# Patient Record
Sex: MALE | Race: WHITE | Employment: OTHER | URBAN - METROPOLITAN AREA
[De-identification: names, ages, dates, MRNs, and addresses within clinical notes are randomized per-mention and may not be internally consistent; named-entity substitution may affect disease eponyms.]

---

## 2017-03-25 ENCOUNTER — LAB REQUISITION (OUTPATIENT)
Dept: LAB | Facility: HOSPITAL | Age: 77
End: 2017-03-25
Attending: FAMILY MEDICINE
Payer: MEDICARE

## 2017-03-25 DIAGNOSIS — Z79.01 LONG TERM CURRENT USE OF ANTICOAGULANT: ICD-10-CM

## 2017-03-25 LAB
INR BLD: 2.83 (ref 0.89–1.11)
PSA SERPL DL<=0.01 NG/ML-MCNC: 30.3 SECONDS (ref 12–14.3)

## 2017-03-25 PROCEDURE — 85610 PROTHROMBIN TIME: CPT | Performed by: FAMILY MEDICINE

## 2019-01-01 ENCOUNTER — HOSPITAL ENCOUNTER (EMERGENCY)
Facility: HOSPITAL | Age: 79
Discharge: HOME | End: 2019-01-01
Attending: EMERGENCY MEDICINE
Payer: MEDICARE

## 2019-01-01 ENCOUNTER — APPOINTMENT (OUTPATIENT)
Dept: GENERAL RADIOLOGY | Facility: HOSPITAL | Age: 79
End: 2019-01-01
Attending: EMERGENCY MEDICINE
Payer: MEDICARE

## 2019-01-01 ENCOUNTER — APPOINTMENT (OUTPATIENT)
Dept: CT IMAGING | Facility: HOSPITAL | Age: 79
End: 2019-01-01
Attending: EMERGENCY MEDICINE
Payer: MEDICARE

## 2019-01-01 VITALS
HEART RATE: 93 BPM | DIASTOLIC BLOOD PRESSURE: 86 MMHG | RESPIRATION RATE: 19 BRPM | OXYGEN SATURATION: 93 % | WEIGHT: 225 LBS | BODY MASS INDEX: 35 KG/M2 | SYSTOLIC BLOOD PRESSURE: 150 MMHG

## 2019-01-01 DIAGNOSIS — R41.82 ALTERED MENTAL STATUS, UNSPECIFIED ALTERED MENTAL STATUS TYPE: Primary | ICD-10-CM

## 2019-01-01 PROCEDURE — 85730 THROMBOPLASTIN TIME PARTIAL: CPT | Performed by: EMERGENCY MEDICINE

## 2019-01-01 PROCEDURE — 80053 COMPREHEN METABOLIC PANEL: CPT | Performed by: EMERGENCY MEDICINE

## 2019-01-01 PROCEDURE — 85025 COMPLETE CBC W/AUTO DIFF WBC: CPT | Performed by: EMERGENCY MEDICINE

## 2019-01-01 PROCEDURE — 99285 EMERGENCY DEPT VISIT HI MDM: CPT

## 2019-01-01 PROCEDURE — 96361 HYDRATE IV INFUSION ADD-ON: CPT

## 2019-01-01 PROCEDURE — 93010 ELECTROCARDIOGRAM REPORT: CPT

## 2019-01-01 PROCEDURE — 96360 HYDRATION IV INFUSION INIT: CPT

## 2019-01-01 PROCEDURE — 71045 X-RAY EXAM CHEST 1 VIEW: CPT | Performed by: EMERGENCY MEDICINE

## 2019-01-01 PROCEDURE — 85610 PROTHROMBIN TIME: CPT | Performed by: EMERGENCY MEDICINE

## 2019-01-01 PROCEDURE — 82962 GLUCOSE BLOOD TEST: CPT

## 2019-01-01 PROCEDURE — 70450 CT HEAD/BRAIN W/O DYE: CPT | Performed by: EMERGENCY MEDICINE

## 2019-01-01 PROCEDURE — 84484 ASSAY OF TROPONIN QUANT: CPT | Performed by: EMERGENCY MEDICINE

## 2019-01-01 PROCEDURE — 93005 ELECTROCARDIOGRAM TRACING: CPT

## 2019-01-01 RX ORDER — SODIUM CHLORIDE 9 MG/ML
125 INJECTION, SOLUTION INTRAVENOUS CONTINUOUS
Status: DISCONTINUED | OUTPATIENT
Start: 2019-01-01 | End: 2019-01-01

## 2019-06-13 NOTE — ED INITIAL ASSESSMENT (HPI)
Staff found pt in his bed with left sided facial droop. Pt A&OX2 when at  his baseline. Pt presents with DNR paperwork and son at bedside.

## 2019-06-13 NOTE — ED PROVIDER NOTES
Patient Seen in: [facility] Emergency Department    History   Patient presents with:  Stroke (neurologic)    Stated Complaint: Left sided facial weakness    HPI    This is a 17-year-old DNR male who presents with possible stroke past medical history 127/85   Pulse 82   Resp 15   Temp    Temp src    SpO2 96 %   O2 Device        Current:/84   Pulse 88   Resp 16   Wt 102.1 kg   SpO2 97%   BMI 35.24 kg/m²         Physical Exam    GENERAL: Lying in cart with eyes closed.   He will open his eyes when a CBC W/ DIFFERENTIAL[[id number]]          Abnormal            Final result                 Please view results for these tests on the individual orders.    URINALYSIS WITH CULTURE REFLEX   RAINBOW DRAW BLUE   RAINBOW DRAW LAVENDER   RAINBOW DRAW LIGHT GREE cardiac monitor, continuous pulse oximetry and IV line was established of normal saline. Basic labs were obtained. CBC: White blood cell count 8.6. Hemoglobin 15.9. Platelet 474. CMP unremarkable. Troponin negative. INR 2.1.   X-ray showed cardiomega

## 2019-06-14 NOTE — CM/SW NOTE
Had discussion with wife of patient, her two sons and daughter in law. Pt is at [facility], under palliative care. We spoke at length about comfort management including having the discussion with his care team about hospice care.  Provided family with hospice

## 2020-01-01 ENCOUNTER — APPOINTMENT (OUTPATIENT)
Dept: GENERAL RADIOLOGY | Facility: HOSPITAL | Age: 80
DRG: 177 | End: 2020-01-01
Attending: HOSPITALIST
Payer: MEDICARE

## 2020-01-01 ENCOUNTER — HOSPITAL ENCOUNTER (INPATIENT)
Facility: HOSPITAL | Age: 80
LOS: 1 days | DRG: 177 | End: 2020-01-01
Attending: INTERNAL MEDICINE | Admitting: INTERNAL MEDICINE
Payer: OTHER MISCELLANEOUS

## 2020-01-01 ENCOUNTER — APPOINTMENT (OUTPATIENT)
Dept: GENERAL RADIOLOGY | Facility: HOSPITAL | Age: 80
DRG: 177 | End: 2020-01-01
Attending: INTERNAL MEDICINE
Payer: MEDICARE

## 2020-01-01 ENCOUNTER — APPOINTMENT (OUTPATIENT)
Dept: GENERAL RADIOLOGY | Facility: HOSPITAL | Age: 80
DRG: 177 | End: 2020-01-01
Attending: EMERGENCY MEDICINE
Payer: MEDICARE

## 2020-01-01 ENCOUNTER — HOSPITAL ENCOUNTER (INPATIENT)
Facility: HOSPITAL | Age: 80
LOS: 10 days | Discharge: HOSPICE | DRG: 177 | End: 2020-01-01
Attending: EMERGENCY MEDICINE | Admitting: HOSPITALIST
Payer: MEDICARE

## 2020-01-01 VITALS
TEMPERATURE: 99 F | OXYGEN SATURATION: 76 % | DIASTOLIC BLOOD PRESSURE: 35 MMHG | SYSTOLIC BLOOD PRESSURE: 63 MMHG | RESPIRATION RATE: 20 BRPM | HEART RATE: 92 BPM

## 2020-01-01 VITALS
HEIGHT: 67 IN | OXYGEN SATURATION: 95 % | HEART RATE: 79 BPM | TEMPERATURE: 98 F | RESPIRATION RATE: 19 BRPM | DIASTOLIC BLOOD PRESSURE: 65 MMHG | BODY MASS INDEX: 36.1 KG/M2 | WEIGHT: 230 LBS | SYSTOLIC BLOOD PRESSURE: 132 MMHG
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
## 2020-01-01 DIAGNOSIS — E86.0 DEHYDRATION: ICD-10-CM

## 2020-01-01 DIAGNOSIS — J18.9 NOSOCOMIAL PNEUMONIA: Primary | ICD-10-CM

## 2020-01-01 LAB
ADENOVIRUS PCR:: NEGATIVE
ALBUMIN SERPL-MCNC: 3 G/DL (ref 3.4–5)
ALBUMIN/GLOB SERPL: 0.7 {RATIO} (ref 1–2)
ALLENS TEST: POSITIVE
ALLENS TEST: POSITIVE
ALP LIVER SERPL-CCNC: 72 U/L (ref 45–117)
ALT SERPL-CCNC: 21 U/L (ref 16–61)
ANION GAP SERPL CALC-SCNC: 4 MMOL/L (ref 0–18)
ANION GAP SERPL CALC-SCNC: 4 MMOL/L (ref 0–18)
ANION GAP SERPL CALC-SCNC: 5 MMOL/L (ref 0–18)
ANION GAP SERPL CALC-SCNC: 9 MMOL/L (ref 0–18)
APTT PPP: 38.2 SECONDS (ref 25.4–36.1)
ARTERIAL BLD GAS O2 SATURATION: 95 % (ref 92–100)
ARTERIAL BLD GAS O2 SATURATION: 97 % (ref 92–100)
ARTERIAL BLOOD GAS BASE EXCESS: -1.4 MMOL/L (ref ?–2)
ARTERIAL BLOOD GAS BASE EXCESS: -1.9 MMOL/L (ref ?–2)
ARTERIAL BLOOD GAS HCO3: 21.1 MEQ/L (ref 22–26)
ARTERIAL BLOOD GAS HCO3: 22.3 MEQ/L (ref 22–26)
ARTERIAL BLOOD GAS PCO2: 32 MM HG (ref 35–45)
ARTERIAL BLOOD GAS PCO2: 36 MM HG (ref 35–45)
ARTERIAL BLOOD GAS PH: 7.41 (ref 7.35–7.45)
ARTERIAL BLOOD GAS PH: 7.45 (ref 7.35–7.45)
ARTERIAL BLOOD GAS PO2: 121 MM HG (ref 80–105)
ARTERIAL BLOOD GAS PO2: 83 MM HG (ref 80–105)
AST SERPL-CCNC: 17 U/L (ref 15–37)
ATRIAL RATE: 120 BPM
ATRIAL RATE: 79 BPM
B PERT DNA SPEC QL NAA+PROBE: NEGATIVE
BASOPHILS # BLD AUTO: 0.02 X10(3) UL (ref 0–0.2)
BASOPHILS # BLD AUTO: 0.03 X10(3) UL (ref 0–0.2)
BASOPHILS # BLD AUTO: 0.03 X10(3) UL (ref 0–0.2)
BASOPHILS NFR BLD AUTO: 0.2 %
BASOPHILS NFR BLD AUTO: 0.2 %
BASOPHILS NFR BLD AUTO: 0.3 %
BILIRUB SERPL-MCNC: 0.3 MG/DL (ref 0.1–2)
BILIRUB UR QL STRIP.AUTO: NEGATIVE
BUN BLD-MCNC: 17 MG/DL (ref 7–18)
BUN BLD-MCNC: 18 MG/DL (ref 7–18)
BUN BLD-MCNC: 19 MG/DL (ref 7–18)
BUN BLD-MCNC: 7 MG/DL (ref 7–18)
BUN/CREAT SERPL: 16.7 (ref 10–20)
BUN/CREAT SERPL: 20.5 (ref 10–20)
BUN/CREAT SERPL: 23.8 (ref 10–20)
BUN/CREAT SERPL: 9.7 (ref 10–20)
C DIFF TOX B STL QL: NEGATIVE
C PNEUM DNA SPEC QL NAA+PROBE: NEGATIVE
CALCIUM BLD-MCNC: 7.4 MG/DL (ref 8.5–10.1)
CALCIUM BLD-MCNC: 7.5 MG/DL (ref 8.5–10.1)
CALCIUM BLD-MCNC: 7.9 MG/DL (ref 8.5–10.1)
CALCIUM BLD-MCNC: 8.6 MG/DL (ref 8.5–10.1)
CALCULATED O2 SATURATION: 96 % (ref 92–100)
CALCULATED O2 SATURATION: 99 % (ref 92–100)
CARBOXYHEMOGLOBIN: 1 % SAT (ref 0–3)
CARBOXYHEMOGLOBIN: 1.2 % SAT (ref 0–3)
CHLORIDE SERPL-SCNC: 106 MMOL/L (ref 98–112)
CHLORIDE SERPL-SCNC: 110 MMOL/L (ref 98–112)
CHLORIDE SERPL-SCNC: 113 MMOL/L (ref 98–112)
CHLORIDE SERPL-SCNC: 114 MMOL/L (ref 98–112)
CO2 SERPL-SCNC: 22 MMOL/L (ref 21–32)
CO2 SERPL-SCNC: 24 MMOL/L (ref 21–32)
CO2 SERPL-SCNC: 24 MMOL/L (ref 21–32)
CO2 SERPL-SCNC: 25 MMOL/L (ref 21–32)
COLOR UR AUTO: YELLOW
CORONAVIRUS 229E PCR:: NEGATIVE
CORONAVIRUS HKU1 PCR:: NEGATIVE
CORONAVIRUS NL63 PCR:: NEGATIVE
CORONAVIRUS OC43 PCR:: NEGATIVE
CREAT BLD-MCNC: 0.72 MG/DL (ref 0.7–1.3)
CREAT BLD-MCNC: 0.8 MG/DL (ref 0.7–1.3)
CREAT BLD-MCNC: 0.88 MG/DL (ref 0.7–1.3)
CREAT BLD-MCNC: 1.02 MG/DL (ref 0.7–1.3)
DEPRECATED RDW RBC AUTO: 47.8 FL (ref 35.1–46.3)
DEPRECATED RDW RBC AUTO: 49.6 FL (ref 35.1–46.3)
DEPRECATED RDW RBC AUTO: 51.1 FL (ref 35.1–46.3)
EOSINOPHIL # BLD AUTO: 0 X10(3) UL (ref 0–0.7)
EOSINOPHIL # BLD AUTO: 0 X10(3) UL (ref 0–0.7)
EOSINOPHIL # BLD AUTO: 0.3 X10(3) UL (ref 0–0.7)
EOSINOPHIL NFR BLD AUTO: 0 %
EOSINOPHIL NFR BLD AUTO: 0 %
EOSINOPHIL NFR BLD AUTO: 3.4 %
ERYTHROCYTE [DISTWIDTH] IN BLOOD BY AUTOMATED COUNT: 14.2 % (ref 11–15)
ERYTHROCYTE [DISTWIDTH] IN BLOOD BY AUTOMATED COUNT: 14.5 % (ref 11–15)
ERYTHROCYTE [DISTWIDTH] IN BLOOD BY AUTOMATED COUNT: 14.6 % (ref 11–15)
FIO2: 100 %
FLUAV H1 2009 PAND RNA SPEC QL NAA+PROBE: POSITIVE
FLUBV RNA SPEC QL NAA+PROBE: NEGATIVE
GLOBULIN PLAS-MCNC: 4.5 G/DL (ref 2.8–4.4)
GLUCOSE BLD-MCNC: 101 MG/DL (ref 70–99)
GLUCOSE BLD-MCNC: 103 MG/DL (ref 70–99)
GLUCOSE BLD-MCNC: 103 MG/DL (ref 70–99)
GLUCOSE BLD-MCNC: 104 MG/DL (ref 70–99)
GLUCOSE BLD-MCNC: 104 MG/DL (ref 70–99)
GLUCOSE BLD-MCNC: 110 MG/DL (ref 70–99)
GLUCOSE BLD-MCNC: 111 MG/DL (ref 70–99)
GLUCOSE BLD-MCNC: 113 MG/DL (ref 70–99)
GLUCOSE BLD-MCNC: 114 MG/DL (ref 70–99)
GLUCOSE BLD-MCNC: 114 MG/DL (ref 70–99)
GLUCOSE BLD-MCNC: 123 MG/DL (ref 70–99)
GLUCOSE BLD-MCNC: 123 MG/DL (ref 70–99)
GLUCOSE BLD-MCNC: 132 MG/DL (ref 70–99)
GLUCOSE BLD-MCNC: 133 MG/DL (ref 70–99)
GLUCOSE BLD-MCNC: 133 MG/DL (ref 70–99)
GLUCOSE BLD-MCNC: 140 MG/DL (ref 70–99)
GLUCOSE BLD-MCNC: 67 MG/DL (ref 70–99)
GLUCOSE BLD-MCNC: 72 MG/DL (ref 70–99)
GLUCOSE BLD-MCNC: 76 MG/DL (ref 70–99)
GLUCOSE BLD-MCNC: 78 MG/DL (ref 70–99)
GLUCOSE BLD-MCNC: 82 MG/DL (ref 70–99)
GLUCOSE BLD-MCNC: 83 MG/DL (ref 70–99)
GLUCOSE BLD-MCNC: 84 MG/DL (ref 70–99)
GLUCOSE BLD-MCNC: 87 MG/DL (ref 70–99)
GLUCOSE BLD-MCNC: 89 MG/DL (ref 70–99)
GLUCOSE BLD-MCNC: 90 MG/DL (ref 70–99)
GLUCOSE BLD-MCNC: 90 MG/DL (ref 70–99)
GLUCOSE BLD-MCNC: 96 MG/DL (ref 70–99)
GLUCOSE BLD-MCNC: 98 MG/DL (ref 70–99)
GLUCOSE UR STRIP.AUTO-MCNC: NEGATIVE MG/DL
HAV IGM SER QL: 1.9 MG/DL (ref 1.6–2.6)
HAV IGM SER QL: 1.9 MG/DL (ref 1.6–2.6)
HAV IGM SER QL: 2 MG/DL (ref 1.6–2.6)
HAV IGM SER QL: 2.1 MG/DL (ref 1.6–2.6)
HAV IGM SER QL: 2.1 MG/DL (ref 1.6–2.6)
HCT VFR BLD AUTO: 36.6 % (ref 39–53)
HCT VFR BLD AUTO: 41 % (ref 39–53)
HCT VFR BLD AUTO: 48.1 % (ref 39–53)
HGB BLD-MCNC: 11.8 G/DL (ref 13–17.5)
HGB BLD-MCNC: 13.1 G/DL (ref 13–17.5)
HGB BLD-MCNC: 15.9 G/DL (ref 13–17.5)
IMM GRANULOCYTES # BLD AUTO: 0.03 X10(3) UL (ref 0–1)
IMM GRANULOCYTES # BLD AUTO: 0.04 X10(3) UL (ref 0–1)
IMM GRANULOCYTES # BLD AUTO: 0.11 X10(3) UL (ref 0–1)
IMM GRANULOCYTES NFR BLD: 0.2 %
IMM GRANULOCYTES NFR BLD: 0.5 %
IMM GRANULOCYTES NFR BLD: 1.2 %
INR BLD: 1.45 (ref 0.9–1.1)
INR BLD: 1.6 (ref 0.9–1.1)
INR BLD: 1.7 (ref 0.9–1.1)
INR BLD: 2 (ref 0.9–1.1)
INR BLD: 2.06 (ref 0.9–1.1)
INR BLD: 2.07 (ref 0.9–1.1)
INR BLD: 2.66 (ref 0.9–1.1)
INR BLD: 2.69 (ref 0.9–1.1)
IONIZED CALCIUM: 1.17 MMOL/L (ref 1.12–1.32)
KETONES UR STRIP.AUTO-MCNC: 20 MG/DL
L PNEUMO AG UR QL: NEGATIVE
L/M: 15 L/MIN
L/M: 6 L/MIN
LACTATE SERPL-SCNC: 2.6 MMOL/L (ref 0.4–2)
LACTATE SERPL-SCNC: 2.7 MMOL/L (ref 0.4–2)
LACTATE SERPL-SCNC: 4.5 MMOL/L (ref 0.4–2)
LACTIC ACID ARTERIAL: <1.6 MMOL/L (ref 0.5–2)
LEUKOCYTE ESTERASE UR QL STRIP.AUTO: NEGATIVE
LYMPHOCYTES # BLD AUTO: 0.43 X10(3) UL (ref 1–4)
LYMPHOCYTES # BLD AUTO: 1.1 X10(3) UL (ref 1–4)
LYMPHOCYTES # BLD AUTO: 1.35 X10(3) UL (ref 1–4)
LYMPHOCYTES NFR BLD AUTO: 12.3 %
LYMPHOCYTES NFR BLD AUTO: 15.3 %
LYMPHOCYTES NFR BLD AUTO: 3.4 %
M PROTEIN MFR SERPL ELPH: 7.5 G/DL (ref 6.4–8.2)
MCH RBC QN AUTO: 29.6 PG (ref 26–34)
MCH RBC QN AUTO: 30.1 PG (ref 26–34)
MCH RBC QN AUTO: 30.7 PG (ref 26–34)
MCHC RBC AUTO-ENTMCNC: 32 G/DL (ref 31–37)
MCHC RBC AUTO-ENTMCNC: 32.2 G/DL (ref 31–37)
MCHC RBC AUTO-ENTMCNC: 33.1 G/DL (ref 31–37)
MCV RBC AUTO: 91.7 FL (ref 80–100)
MCV RBC AUTO: 92.9 FL (ref 80–100)
MCV RBC AUTO: 94.3 FL (ref 80–100)
METAPNEUMOVIRUS PCR:: NEGATIVE
METHEMOGLOBIN: 0.6 % SAT (ref 0.4–1.5)
METHEMOGLOBIN: 0.7 % SAT (ref 0.4–1.5)
MONOCYTES # BLD AUTO: 0.35 X10(3) UL (ref 0.1–1)
MONOCYTES # BLD AUTO: 0.57 X10(3) UL (ref 0.1–1)
MONOCYTES # BLD AUTO: 0.66 X10(3) UL (ref 0.1–1)
MONOCYTES NFR BLD AUTO: 4 %
MONOCYTES NFR BLD AUTO: 4.5 %
MONOCYTES NFR BLD AUTO: 7.4 %
MYCOPLASMA PNEUMONIA PCR:: NEGATIVE
NEUTROPHILS # BLD AUTO: 11.57 X10 (3) UL (ref 1.5–7.7)
NEUTROPHILS # BLD AUTO: 11.57 X10(3) UL (ref 1.5–7.7)
NEUTROPHILS # BLD AUTO: 6.74 X10 (3) UL (ref 1.5–7.7)
NEUTROPHILS # BLD AUTO: 6.74 X10(3) UL (ref 1.5–7.7)
NEUTROPHILS # BLD AUTO: 7.04 X10 (3) UL (ref 1.5–7.7)
NEUTROPHILS # BLD AUTO: 7.04 X10(3) UL (ref 1.5–7.7)
NEUTROPHILS NFR BLD AUTO: 75.5 %
NEUTROPHILS NFR BLD AUTO: 79.9 %
NEUTROPHILS NFR BLD AUTO: 91.7 %
NITRITE UR QL STRIP.AUTO: POSITIVE
NT-PROBNP SERPL-MCNC: 749 PG/ML (ref ?–450)
OSMOLALITY SERPL CALC.SUM OF ELEC: 285 MOSM/KG (ref 275–295)
OSMOLALITY SERPL CALC.SUM OF ELEC: 288 MOSM/KG (ref 275–295)
OSMOLALITY SERPL CALC.SUM OF ELEC: 296 MOSM/KG (ref 275–295)
OSMOLALITY SERPL CALC.SUM OF ELEC: 297 MOSM/KG (ref 275–295)
P AXIS: 26 DEGREES
P AXIS: 35 DEGREES
P-R INTERVAL: 134 MS
P-R INTERVAL: 146 MS
P/F RATIO: 76 MMHG
PARAINFLUENZA 1 PCR:: NEGATIVE
PARAINFLUENZA 2 PCR:: NEGATIVE
PARAINFLUENZA 3 PCR:: NEGATIVE
PARAINFLUENZA 4 PCR:: NEGATIVE
PATIENT TEMPERATURE: 101.1 F
PATIENT TEMPERATURE: 98.2 F
PH UR STRIP.AUTO: 5 [PH] (ref 4.5–8)
PHOSPHATE SERPL-MCNC: 1.6 MG/DL (ref 2.5–4.9)
PHOSPHATE SERPL-MCNC: 1.8 MG/DL (ref 2.5–4.9)
PHOSPHATE SERPL-MCNC: 1.9 MG/DL (ref 2.5–4.9)
PHOSPHATE SERPL-MCNC: 2.1 MG/DL (ref 2.5–4.9)
PLATELET # BLD AUTO: 190 10(3)UL (ref 150–450)
PLATELET # BLD AUTO: 233 10(3)UL (ref 150–450)
PLATELET # BLD AUTO: 289 10(3)UL (ref 150–450)
POTASSIUM BLOOD GAS: 3.5 MMOL/L (ref 3.6–5.1)
POTASSIUM SERPL-SCNC: 2.8 MMOL/L (ref 3.5–5.1)
POTASSIUM SERPL-SCNC: 2.8 MMOL/L (ref 3.5–5.1)
POTASSIUM SERPL-SCNC: 3 MMOL/L (ref 3.5–5.1)
POTASSIUM SERPL-SCNC: 3.1 MMOL/L (ref 3.5–5.1)
POTASSIUM SERPL-SCNC: 3.3 MMOL/L (ref 3.5–5.1)
POTASSIUM SERPL-SCNC: 3.3 MMOL/L (ref 3.5–5.1)
POTASSIUM SERPL-SCNC: 3.4 MMOL/L (ref 3.5–5.1)
POTASSIUM SERPL-SCNC: 3.4 MMOL/L (ref 3.5–5.1)
POTASSIUM SERPL-SCNC: 3.5 MMOL/L (ref 3.5–5.1)
POTASSIUM SERPL-SCNC: 3.5 MMOL/L (ref 3.5–5.1)
POTASSIUM SERPL-SCNC: 3.6 MMOL/L (ref 3.5–5.1)
POTASSIUM SERPL-SCNC: 3.6 MMOL/L (ref 3.5–5.1)
POTASSIUM SERPL-SCNC: 3.8 MMOL/L (ref 3.5–5.1)
POTASSIUM SERPL-SCNC: 4.2 MMOL/L (ref 3.5–5.1)
PROT UR STRIP.AUTO-MCNC: NEGATIVE MG/DL
PSA SERPL DL<=0.01 NG/ML-MCNC: 18.4 SECONDS (ref 12.5–14.7)
PSA SERPL DL<=0.01 NG/ML-MCNC: 19.9 SECONDS (ref 12.5–14.7)
PSA SERPL DL<=0.01 NG/ML-MCNC: 20.9 SECONDS (ref 12.5–14.7)
PSA SERPL DL<=0.01 NG/ML-MCNC: 23.9 SECONDS (ref 12.5–14.7)
PSA SERPL DL<=0.01 NG/ML-MCNC: 24.5 SECONDS (ref 12.5–14.7)
PSA SERPL DL<=0.01 NG/ML-MCNC: 24.6 SECONDS (ref 12.5–14.7)
PSA SERPL DL<=0.01 NG/ML-MCNC: 30.1 SECONDS (ref 12.5–14.7)
PSA SERPL DL<=0.01 NG/ML-MCNC: 30.4 SECONDS (ref 12.5–14.7)
Q-T INTERVAL: 318 MS
Q-T INTERVAL: 386 MS
QRS DURATION: 88 MS
QRS DURATION: 88 MS
QTC CALCULATION (BEZET): 442 MS
QTC CALCULATION (BEZET): 449 MS
R AXIS: -4 DEGREES
R AXIS: -6 DEGREES
RBC # BLD AUTO: 3.99 X10(6)UL (ref 3.8–5.8)
RBC # BLD AUTO: 4.35 X10(6)UL (ref 3.8–5.8)
RBC # BLD AUTO: 5.18 X10(6)UL (ref 3.8–5.8)
RHINOVIRUS/ENTERO PCR:: NEGATIVE
RSV RNA SPEC QL NAA+PROBE: NEGATIVE
SODIUM BLOOD GAS: 145 MMOL/L (ref 136–144)
SODIUM SERPL-SCNC: 137 MMOL/L (ref 136–145)
SODIUM SERPL-SCNC: 138 MMOL/L (ref 136–145)
SODIUM SERPL-SCNC: 142 MMOL/L (ref 136–145)
SODIUM SERPL-SCNC: 143 MMOL/L (ref 136–145)
SP GR UR STRIP.AUTO: 1.02 (ref 1–1.03)
STREP PNEUMO ANTIGEN, URINE: NEGATIVE
T AXIS: 23 DEGREES
T AXIS: 5 DEGREES
TOTAL HEMOGLOBIN: 12.4 G/DL (ref 12.6–17.4)
TOTAL HEMOGLOBIN: 14.8 G/DL (ref 12.6–17.4)
UROBILINOGEN UR STRIP.AUTO-MCNC: <2 MG/DL
VENTRICULAR RATE: 120 BPM
VENTRICULAR RATE: 79 BPM
WBC # BLD AUTO: 12.6 X10(3) UL (ref 4–11)
WBC # BLD AUTO: 8.8 X10(3) UL (ref 4–11)
WBC # BLD AUTO: 8.9 X10(3) UL (ref 4–11)

## 2020-01-01 PROCEDURE — 99223 1ST HOSP IP/OBS HIGH 75: CPT | Performed by: HOSPITALIST

## 2020-01-01 PROCEDURE — 99223 1ST HOSP IP/OBS HIGH 75: CPT | Performed by: NURSE PRACTITIONER

## 2020-01-01 PROCEDURE — 71045 X-RAY EXAM CHEST 1 VIEW: CPT | Performed by: INTERNAL MEDICINE

## 2020-01-01 PROCEDURE — 99233 SBSQ HOSP IP/OBS HIGH 50: CPT | Performed by: HOSPITALIST

## 2020-01-01 PROCEDURE — 99222 1ST HOSP IP/OBS MODERATE 55: CPT | Performed by: INTERNAL MEDICINE

## 2020-01-01 PROCEDURE — 99232 SBSQ HOSP IP/OBS MODERATE 35: CPT | Performed by: INTERNAL MEDICINE

## 2020-01-01 PROCEDURE — 71045 X-RAY EXAM CHEST 1 VIEW: CPT | Performed by: HOSPITALIST

## 2020-01-01 PROCEDURE — 99233 SBSQ HOSP IP/OBS HIGH 50: CPT | Performed by: NURSE PRACTITIONER

## 2020-01-01 PROCEDURE — 71045 X-RAY EXAM CHEST 1 VIEW: CPT | Performed by: EMERGENCY MEDICINE

## 2020-01-01 RX ORDER — OMEPRAZOLE 10 MG/1
10 CAPSULE, DELAYED RELEASE ORAL DAILY
Status: ON HOLD | COMMUNITY
Start: 2020-01-01 | End: 2020-01-01

## 2020-01-01 RX ORDER — SCOLOPAMINE TRANSDERMAL SYSTEM 1 MG/1
1 PATCH, EXTENDED RELEASE TRANSDERMAL
Status: DISCONTINUED | OUTPATIENT
Start: 2020-01-01 | End: 2020-01-01

## 2020-01-01 RX ORDER — IPRATROPIUM BROMIDE AND ALBUTEROL SULFATE 2.5; .5 MG/3ML; MG/3ML
3 SOLUTION RESPIRATORY (INHALATION)
Status: DISCONTINUED | OUTPATIENT
Start: 2020-01-01 | End: 2020-01-01

## 2020-01-01 RX ORDER — ACETAMINOPHEN 650 MG/1
650 SUPPOSITORY RECTAL EVERY 6 HOURS PRN
Status: DISCONTINUED | OUTPATIENT
Start: 2020-01-01 | End: 2020-01-01

## 2020-01-01 RX ORDER — HYDRALAZINE HYDROCHLORIDE 20 MG/ML
10 INJECTION INTRAMUSCULAR; INTRAVENOUS EVERY 4 HOURS PRN
Status: DISCONTINUED | OUTPATIENT
Start: 2020-01-01 | End: 2020-01-01

## 2020-01-01 RX ORDER — ACETAMINOPHEN 650 MG/1
650 SUPPOSITORY RECTAL EVERY 4 HOURS PRN
Status: DISCONTINUED | OUTPATIENT
Start: 2020-01-01 | End: 2020-01-01

## 2020-01-01 RX ORDER — DONEPEZIL HYDROCHLORIDE 10 MG/1
10 TABLET, FILM COATED ORAL NIGHTLY
Status: DISCONTINUED | OUTPATIENT
Start: 2020-01-01 | End: 2020-01-01

## 2020-01-01 RX ORDER — TRAMADOL HYDROCHLORIDE 50 MG/1
50 TABLET ORAL DAILY PRN
Status: ON HOLD | COMMUNITY
End: 2020-01-01

## 2020-01-01 RX ORDER — ROPINIROLE 1 MG/1
1 TABLET, FILM COATED ORAL 3 TIMES DAILY
Status: DISCONTINUED | OUTPATIENT
Start: 2020-01-01 | End: 2020-01-01

## 2020-01-01 RX ORDER — IPRATROPIUM BROMIDE AND ALBUTEROL SULFATE 2.5; .5 MG/3ML; MG/3ML
3 SOLUTION RESPIRATORY (INHALATION) EVERY 6 HOURS PRN
Status: DISCONTINUED | OUTPATIENT
Start: 2020-01-01 | End: 2020-01-01

## 2020-01-01 RX ORDER — GLYCOPYRROLATE 0.2 MG/ML
0.4 INJECTION, SOLUTION INTRAMUSCULAR; INTRAVENOUS
Status: DISCONTINUED | OUTPATIENT
Start: 2020-01-01 | End: 2020-01-01

## 2020-01-01 RX ORDER — TRAMADOL HYDROCHLORIDE 50 MG/1
50 TABLET ORAL EVERY 8 HOURS PRN
Status: ON HOLD | COMMUNITY
End: 2020-01-01

## 2020-01-01 RX ORDER — ONDANSETRON 4 MG/1
4 TABLET, ORALLY DISINTEGRATING ORAL EVERY 6 HOURS PRN
Status: DISCONTINUED | OUTPATIENT
Start: 2020-01-01 | End: 2020-01-01

## 2020-01-01 RX ORDER — LORAZEPAM 2 MG/ML
2 INJECTION INTRAMUSCULAR EVERY 4 HOURS PRN
Status: DISCONTINUED | OUTPATIENT
Start: 2020-01-01 | End: 2020-01-01

## 2020-01-01 RX ORDER — SODIUM CHLORIDE 9 MG/ML
INJECTION, SOLUTION INTRAVENOUS CONTINUOUS
Status: DISCONTINUED | OUTPATIENT
Start: 2020-01-01 | End: 2020-01-01

## 2020-01-01 RX ORDER — HALOPERIDOL 5 MG/ML
1 INJECTION INTRAMUSCULAR
Status: DISCONTINUED | OUTPATIENT
Start: 2020-01-01 | End: 2020-01-01

## 2020-01-01 RX ORDER — IPRATROPIUM BROMIDE AND ALBUTEROL SULFATE 2.5; .5 MG/3ML; MG/3ML
3 SOLUTION RESPIRATORY (INHALATION) EVERY 4 HOURS PRN
Status: DISCONTINUED | OUTPATIENT
Start: 2020-01-01 | End: 2020-01-01

## 2020-01-01 RX ORDER — ACETAMINOPHEN 650 MG/1
SUPPOSITORY RECTAL
Status: DISPENSED
Start: 2020-01-01 | End: 2020-01-01

## 2020-01-01 RX ORDER — ENOXAPARIN SODIUM 100 MG/ML
40 INJECTION SUBCUTANEOUS DAILY
Status: DISCONTINUED | OUTPATIENT
Start: 2020-01-01 | End: 2020-01-01

## 2020-01-01 RX ORDER — LORAZEPAM 2 MG/ML
0.5 INJECTION INTRAMUSCULAR EVERY 4 HOURS PRN
Status: DISCONTINUED | OUTPATIENT
Start: 2020-01-01 | End: 2020-01-01

## 2020-01-01 RX ORDER — POTASSIUM CHLORIDE 14.9 MG/ML
20 INJECTION INTRAVENOUS ONCE
Status: COMPLETED | OUTPATIENT
Start: 2020-01-01 | End: 2020-01-01

## 2020-01-01 RX ORDER — MORPHINE SULFATE 4 MG/ML
1 INJECTION, SOLUTION INTRAMUSCULAR; INTRAVENOUS
Status: DISCONTINUED | OUTPATIENT
Start: 2020-01-01 | End: 2020-01-01

## 2020-01-01 RX ORDER — DEXTROSE AND SODIUM CHLORIDE 5; .9 G/100ML; G/100ML
INJECTION, SOLUTION INTRAVENOUS CONTINUOUS
Status: DISCONTINUED | OUTPATIENT
Start: 2020-01-01 | End: 2020-01-01

## 2020-01-01 RX ORDER — LORAZEPAM 2 MG/ML
1 INJECTION INTRAMUSCULAR EVERY 4 HOURS PRN
Status: DISCONTINUED | OUTPATIENT
Start: 2020-01-01 | End: 2020-01-01

## 2020-01-01 RX ORDER — ATROPINE SULFATE 10 MG/ML
2 SOLUTION/ DROPS OPHTHALMIC EVERY 2 HOUR PRN
Status: DISCONTINUED | OUTPATIENT
Start: 2020-01-01 | End: 2020-01-01

## 2020-01-01 RX ORDER — SODIUM CHLORIDE 30 MG/ML INHALATION SOLUTION 30 MG/ML
4 SOLUTION INHALANT
Status: DISCONTINUED | OUTPATIENT
Start: 2020-01-01 | End: 2020-01-01

## 2020-01-01 RX ORDER — DONEPEZIL HYDROCHLORIDE 10 MG/1
10 TABLET, FILM COATED ORAL
Status: DISCONTINUED | OUTPATIENT
Start: 2020-01-01 | End: 2020-01-01

## 2020-01-01 RX ORDER — ONDANSETRON 2 MG/ML
4 INJECTION INTRAMUSCULAR; INTRAVENOUS EVERY 6 HOURS PRN
Status: DISCONTINUED | OUTPATIENT
Start: 2020-01-01 | End: 2020-01-01

## 2020-01-01 RX ORDER — BISACODYL 10 MG
10 SUPPOSITORY, RECTAL RECTAL
Status: DISCONTINUED | OUTPATIENT
Start: 2020-01-01 | End: 2020-01-01

## 2020-01-01 RX ORDER — MORPHINE SULFATE 4 MG/ML
2 INJECTION, SOLUTION INTRAMUSCULAR; INTRAVENOUS ONCE
Status: COMPLETED | OUTPATIENT
Start: 2020-01-01 | End: 2020-01-01

## 2020-01-01 RX ORDER — HALOPERIDOL 5 MG/ML
2 INJECTION INTRAMUSCULAR
Status: DISCONTINUED | OUTPATIENT
Start: 2020-01-01 | End: 2020-01-01

## 2020-01-01 RX ORDER — ACETAMINOPHEN 650 MG/1
1300 SUPPOSITORY RECTAL ONCE
Status: COMPLETED | OUTPATIENT
Start: 2020-01-01 | End: 2020-01-01

## 2020-01-01 RX ORDER — WARFARIN SODIUM 2.5 MG/1
2.5 TABLET ORAL NIGHTLY
Status: DISCONTINUED | OUTPATIENT
Start: 2020-01-01 | End: 2020-01-01

## 2020-01-01 RX ORDER — HYDRALAZINE HYDROCHLORIDE 20 MG/ML
INJECTION INTRAMUSCULAR; INTRAVENOUS
Status: DISPENSED
Start: 2020-01-01 | End: 2020-01-01

## 2020-01-01 RX ORDER — OSELTAMIVIR PHOSPHATE 30 MG/1
30 CAPSULE ORAL 2 TIMES DAILY
Status: DISCONTINUED | OUTPATIENT
Start: 2020-01-01 | End: 2020-01-01

## 2020-01-01 RX ORDER — ACETAMINOPHEN 160 MG/5ML
650 SOLUTION ORAL EVERY 4 HOURS PRN
Status: DISCONTINUED | OUTPATIENT
Start: 2020-01-01 | End: 2020-01-01

## 2020-01-01 RX ORDER — MEMANTINE HYDROCHLORIDE 10 MG/1
10 TABLET ORAL 2 TIMES DAILY
Status: DISCONTINUED | OUTPATIENT
Start: 2020-01-01 | End: 2020-01-01

## 2020-01-01 RX ORDER — FINASTERIDE 5 MG/1
5 TABLET, FILM COATED ORAL DAILY
Status: ON HOLD | COMMUNITY
Start: 2020-01-01 | End: 2020-01-01

## 2020-03-06 PROBLEM — N40.0 BPH (BENIGN PROSTATIC HYPERPLASIA): Status: ACTIVE | Noted: 2017-02-12

## 2020-03-06 PROBLEM — J18.9 NOSOCOMIAL PNEUMONIA: Status: ACTIVE | Noted: 2020-01-01

## 2020-03-06 PROBLEM — I82.409 DVT (DEEP VENOUS THROMBOSIS) (HCC): Status: ACTIVE | Noted: 2017-02-12

## 2020-03-06 PROBLEM — F01.50 VASCULAR DEMENTIA (HCC): Status: ACTIVE | Noted: 2017-02-12

## 2020-03-06 PROBLEM — R41.82 ALTERED MENTAL STATUS: Status: ACTIVE | Noted: 2017-10-12
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
## 2020-03-06 PROBLEM — I10 HYPERTENSION: Status: ACTIVE | Noted: 2017-02-12
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
## 2020-03-06 PROBLEM — E86.0 DEHYDRATION: Status: ACTIVE | Noted: 2020-01-01

## 2020-03-06 PROBLEM — G25.81 RLS (RESTLESS LEGS SYNDROME): Status: ACTIVE | Noted: 2017-02-12

## 2020-03-06 NOTE — PROGRESS NOTES
[facility] Pharmacy Note:  Renal Adjustment for oseltamivir (TAMIFLU)    Alicia Whitaker is a 78year old male who has been prescribed oseltamivir (TAMIFLU) 75 mg every 12 hrs. CrCl is estimated creatinine clearance is 54.9 mL/min (based on SCr of 1.02 mg/dL).

## 2020-03-06 NOTE — H&P
[facility]  History and Physical     Fuentes Janak Patient Status:  Emergency    1940 MRN [id number]   Location [address] [facility] Attending Shaquille Masterson MD   Hosp Day # 0 PCP [facility]     Chief Complaint: Aden Prophet tablet, Rfl: 0  DONEPEZIL HCL 10 MG Oral Tab, TAKE 1 TABLET BY MOUTH ONCE DAILY. , Disp: 90 tablet, Rfl: 3  Memantine HCl (NAMENDA) 10 MG Oral Tab, Take 1 tablet (10 mg total) by mouth 2 (two) times daily. , Disp: 180 tablet, Rfl: 3  Warfarin Sodium (COUMADI Appropriate mood and affect.       Diagnostic Data:      Labs:  Recent Labs   Lab 03/05/20 2000 03/06/20  0347   WBC 8.3 12.6*   HGB 14.5 15.9   MCV 93.1 92.9   .0 233.0   INR  --  1.70*       Recent Labs   Lab 03/05/20 2000 03/06/20  0347   GLU 10

## 2020-03-06 NOTE — SLP NOTE
Order received for bedside swallow evaluation; chart reviewed and D/W RN. Pt currently not appropriate due to medical/respiratory status. Will defer at this time.    Lesley Cornejo, MS CCC-SLP/L, pager [phone]  Speech-LanguagePathologist

## 2020-03-06 NOTE — SEPSIS REASSESSMENT
[facility]    Sepsis Reassessment Note    /88   Pulse 116   Temp (!) 101.9 °F (38.8 °C) (Rectal)   Resp 21   Wt 104.3 kg   SpO2 95%   BMI 36.02 kg/m²      4:29 AM    Cardiac:  Regularity: Regular  Rate: Tachycardic  Heart Sounds: No adventitio

## 2020-03-06 NOTE — ED PROVIDER NOTES
Patient Seen in: [facility] Emergency Department      History   Patient presents with:  Dyspnea JESSA SOB    Stated Complaint: jessa    HPI    Patient is a 80-year-old male nursing home resident with a history of hypertension, prior stroke, dementia pre 101.9 °F (38.8 °C) (Rectal)   Resp 21   Wt 104.3 kg   SpO2 95%   BMI 36.02 kg/m²         Physical Exam  Vitals signs and nursing note reviewed. Constitutional:       Appearance: He is well-developed. Comments: Somnolent.   Opens eyes intermittently b All other components within normal limits   LACTIC ACID, PLASMA - Abnormal; Notable for the following components:    Lactic Acid 4.5 (*)     All other components within normal limits   PRO BETA NATRIURETIC PEPTIDE - Abnormal; Notable for the following presenting for evaluation of fever and increased lethargy. Patient has a history of dementia but at baseline is alert and interactive although nonambulatory.   Per nursing home, since last night he has been getting more lethargic and felt warm this morning ICD-10-CM Noted POA    Nosocomial pneumonia J18.9, Y95 3/6/2020 Unknown

## 2020-03-06 NOTE — ED NOTES
Pt was checked for incontinence, repositioned. Called Charge RN to inform them pt is ready for transport.

## 2020-03-07 NOTE — PLAN OF CARE
Patient is lethargic. Opens eyes when name called. Able to squeeze hands on command. Febrile-ice packs applied. Paged order for tylenol suppository, reassess before insertion and pt febrile. Suppository held. VSS.  On tele-NSR, ST (HR sustaining in 13

## 2020-03-07 NOTE — RESPIRATORY THERAPY NOTE
SKIP - Equipment Use Daily Summary:  · Set Mode   · Usage in hours:   · 90% Pressure (EPAP) level:   · 90% Insp Pressure (IPAP):   · AHI:   · Supplemental Oxygen:  · Comments: NO DATA

## 2020-03-07 NOTE — SLP NOTE
Patient continues to require frequent deep suctioning and not appropriate for bedside swallow evaluation at this time. Pt with reported increased intermittent awake states and responsiveness. RN request to defer for today and re-check tomorrow.  Pt remains

## 2020-03-07 NOTE — PROGRESS NOTES
[facility]  Progress Note     Lonn Hemp Patient Status:  Inpatient    1940 MRN [id number]   [facility] 3NE-A Attending Lizbet Lyn MD   Hosp Day # 1 PCP Diana Jones     Chief Complaint: PNA  S:  Confused.   Need BID   • rOPINIRole HCl  1 mg Oral TID   • enoxaparin  40 mg Subcutaneous Daily   • Oseltamivir Phosphate  30 mg Oral BID   • scopolamine  1 patch Transdermal Q72H     ASSESSMENT / PLAN:   1. Pneumonia- Suspect aspiration   1. empiric Zosyn   2.  Follow Cx r

## 2020-03-07 NOTE — PROGRESS NOTES
03/07/20 0407   BiPAP   $ RT Standby Charge (per 15 min) 1   $ SKIP Follow up charge Yes   Device Other  (v30)   BiPAP / CPAP CE# v3   Mode Spontaneous   Interface Full face mask   Mask Size Large   Control Settings   Set IPAP 12   Set EPAP 7   O2 Flow R

## 2020-03-07 NOTE — PLAN OF CARE
Patient lethargic throughout the day, baseline dementia, elevated BP, ST on tele, Non-rebreather  mask to ensure adequate oxygenation   NPO, all oral medications held s/t lethargy  Increased secretions, suctioned prn  IV ABX  IVF  Droplet precautions in pl

## 2020-03-07 NOTE — PROGRESS NOTES
03/06/20 1806   Clinical Encounter Type   Visited With Family   Routine Visit Introduction   Continue Visiting No   Patient's Supportive Strategies/Resources  provided emotional support to family.    Patient Spiritual Encounters   Spiritual Asses

## 2020-03-07 NOTE — PLAN OF CARE
Patient is lethargic. Opens eyes when name called. Able to squeeze hands on command. VSS. On tele-NSR Fever noted this afternoon with temp peak at 1600 at 102.1- rectal tylenol given, icepacks applied.  On reassessment down to 100.6   On venturi mask to pressure ulcer development  - Assess and document skin integrity  - Monitor for areas of redness and/or skin breakdown  - Initiate interventions, skin care algorithm/standards of care as needed  Outcome: Progressing

## 2020-03-08 NOTE — PROGRESS NOTES
[facility]  Progress Note     Gabriel Adams Patient Status:  Inpatient    1940 MRN [id number]   [facility] 3NE-A Attending Chacho Strickland MD   Hosp Day # 2 PCP Kristin Verma     Chief Complaint: PNA  S:  No overnight ev Imaging data reviewed in Epic.   Medications:   • potassium chloride 40mEq IVPB (peripheral line)  40 mEq Intravenous Once   • piperacillin-tazobactam  4.5 g Intravenous Q8H   • Donepezil HCl  10 mg Oral Nightly   • Memantine HCl  10 mg Oral BID   • rOPINIR

## 2020-03-08 NOTE — PLAN OF CARE
Patient is more alert this am, speech is incomprehensible but pt is able to squeeze hands and follow some commands    VSS. On tele-NSR    Monitoring for fevers  On room air since 9am and tolerating well   Oral suction and oral care provided.  Some bleeding and/or skin breakdown  - Initiate interventions, skin care algorithm/standards of care as needed  Outcome: Progressing

## 2020-03-08 NOTE — PLAN OF CARE
Resumed care at 1930  Pt sleeping  Labored breathing, secretions. Suctioned with Yankauer and large amount of sputum removed.  Venti mask on and O2 Sats WNL  When pt did awaken he followed commands  Strict NPO  Stg II on sacrum, mepilex in place  [email or web address] as needed  Outcome: Progressing

## 2020-03-08 NOTE — CONSULTS
[address] Patient Status:  Inpatient    1940 MRN [id number]   [facility] 3NE-A Attending Nanette Falcon MD   [facility] Day # 2 PCP [facility]     Date of Admission: 3/6/2020  3:09 AM  Admission Diagnosis: Deh Home Medications:  No outpatient medications have been marked as taking for the 3/6/20 encounter [facility] Encounter).        Current Medications:    Current Facility-Administered Medications:   •  potassium chloride 40 mEq in sodium chloride 0.9% 250 mL fever, hives, or new allergies       OBJECTIVE:  Patient Vitals for the past 24 hrs:   BP Temp Temp src Pulse Resp SpO2   03/08/20 1102 (!) 160/92 99.2 °F (37.3 °C) Axillary 98 20 98 %   03/08/20 0729 143/78 97.4 °F (36.3 °C) Axillary 78 20 96 %   03/08/20 CO2 25.0 03/08/2020    BUN 19 03/08/2020    CREATSERUM 0.80 03/08/2020    GLU 84 03/08/2020    CA 7.9 03/08/2020     Lab Results   Component Value Date    PT 14.4 01/04/2012    INR 1.60 (H) 03/08/2020    INR 2.06 (H) 03/07/2020    INR 1.70 (H) 03/06/2020

## 2020-03-09 NOTE — PLAN OF CARE
Patient alert to person and place, remains drowsy and confused. Speech garbled/incomprehensible at times. Not always able to follow simple commands. On room air while awake, lungs diminished/coarse with intermittent expiratory wheezes.  Receives scheduled n for pressure ulcer development  - Assess and document skin integrity  - Monitor for areas of redness and/or skin breakdown  - Initiate interventions, skin care algorithm/standards of care as needed  Outcome: Progressing

## 2020-03-09 NOTE — CONSULTS
INFECTIOUS DISEASE CONSULTATION    Janes Partida Patient Status:  Inpatient    1940 MRN [id number]   [facility] [place] 3NE-A Attending Chrystal Oppenheim, MD   Hosp Day # 3 [facility] acetaminophen (TYLENOL) 650 MG rectal suppository 650 mg, 650 mg, Rectal, Q6H PRN  •  0.9% NaCl infusion, , Intravenous, Continuous  •  Piperacillin Sod-Tazobactam So (ZOSYN) 4.5 g in dextrose 5 % 100 mL MBP/ADD-vantage, 4.5 g, Intravenous, Q8H  •  Donepez tablet by mouth daily. , Disp: , Rfl:   acetaminophen (TYLENOL EXTRA STRENGTH) 500 MG Oral Tab, Take 500 mg by mouth every 6 (six) hours as needed for Pain., Disp: , Rfl:         Review of Systems:    A comprehensive 10 point review of systems was completed Result Value Ref Range    Urine Culture 50,000-99,000 CFU/ML Escherichia coli (A) N/A       Susceptibility    Escherichia coli -  (no method available)     Ampicillin 8 Sensitive      Cefazolin <=4 Sensitive      Ciprofloxacin >=4 Resistant      Gentamic

## 2020-03-09 NOTE — PLAN OF CARE
Patient drowsy, lethargic. On 5L, . Scheduled nebs. NSR on tele. Dobhoff placed. Medications via dobhoff. Tube feeds started. Vital 1.2 running at 25mL/hr with 50mL water flush q6. Droplet precautions maintained.   Potassium replaced per protocol

## 2020-03-09 NOTE — PROGRESS NOTES
[facility]  Progress Note     Nam Waldrop Patient Status:  Inpatient    1940 MRN [id number]   [facility] 3NE-A Attending Mily Casper MD   Hosp Day # 3 PCP Bruno Christine     Chief Complaint: PNA  S:  No overnight ev 19.9* 18.4*   INR 2.06* 1.60* 1.45*     No results for input(s): TROP, CK in the last 168 hours. Imaging: Imaging data reviewed in Epic.   Medications:   • potassium chloride 40mEq IVPB (peripheral line)  40 mEq Intravenous Once    Followed by   • potas

## 2020-03-09 NOTE — PROGRESS NOTES
[address] Patient Status:  Inpatient    1940 MRN [id number]   [facility] 3NE-A Attending Judi Pearson MD   Hosp Day # 3 PCP Garcia Hammer     SUBJECTIVE: Pt more lethargic today, not arousable to voice.                         Chest wall: No tenderness or deformity.                         CUEJN: BTMJTAP rate and rhythm, normal S1S2                          Abdomen: soft, non-tender, non-distended, positive BS.                           Extremity: No cl

## 2020-03-09 NOTE — DIETARY NOTE
[facility]    NUTRITION ASSESSMENT    Pt does not meet malnutrition criteria. NUTRITION DIAGNOSIS/PROBLEM:    Inadequate oral intake related to physiological causes as evidenced by NPO status    NUTRITION INTERVENTION:       1.  Meal and Snacks - as nourished per visual exam.     2. Fluid Accumulation: upper extremity edema per visual exam.    NUTRITION PRESCRIPTION:  Calories: 4104-7437 calories/day (11-14 calories per kg)  Protein: 134 grams protein/day (2 grams protein per kg/IBW)  Fluid: ~1 ml/kca

## 2020-03-09 NOTE — SLP NOTE
Attempted to see pt at bedside this AM for swallow evaluation. RN reported pt to be too lethargic and still requiring suctioning. Pt not appropriate for bedside evaluation and PO trials at this time. Will continue to follow up as scheduling permits.  Thank

## 2020-03-09 NOTE — RESPIRATORY THERAPY NOTE
SKIP Equipment Usage Summary :            Set Mode :BIPAP W FLEX          Usage in Hours:10;30          90% Pressure (EPAP) :7            90% Insp Pressure (IPAP);12          AHI : 20.1          Supplemental Oxygen LPM :10

## 2020-03-10 NOTE — RESPIRATORY THERAPY NOTE
SKIP - Equipment Use Daily Summary:                  . Set Mode: BI-FLEX                . Usage in hours: 7:27                . 90% Pressure (EPAP) level: 7                . 90% Insp. Pressure (IPAP): 12                . AHI: 9.5                .  Supplementa

## 2020-03-10 NOTE — PLAN OF CARE
Problem: Impaired Swallowing  Goal: Minimize aspiration risk  Description  -NPO  -Frequent, gentle oral care/hygiene with suction, as tolerated   Outcome: Progressing

## 2020-03-10 NOTE — PROGRESS NOTES
03/10/20 0313   BiPAP   $ RT Standby Charge (per 15 min) 1   $ SKIP Follow up charge Yes   $ BiPAP in use daily Yes   Device VPAP   BiPAP / CPAP CE# V3   Mode Spontaneous   Interface Full face mask   Mask Size Large   Control Settings   Set IPAP 12   Set

## 2020-03-10 NOTE — SLP NOTE
ADULT SWALLOWING EVALUATION    ASSESSMENT    ASSESSMENT/OVERALL IMPRESSION:  Requested by RN/MD to see patient for BSE due to increased alertness and overall responsiveness. Pt received awake, eyes open, and mumbling with occasional true words uttered.  Att respiratory failure with hypoxia (HCC)    WILLIAM (acute kidney injury) ([facility])    Lactic acidosis    Dementia without behavioral disturbance ([facility])    Old cerebrovascular accident (CVA) without late effect      Past Medical History  Past Medical History:   Diagn 03/11/20    Thank you for your referral.   If you have any questions, please contact Rony Deutsch 87 CCC-SLP/L, pager [phone]  Speech-LanguagePathologist

## 2020-03-10 NOTE — PROGRESS NOTES
[facility]                INFECTIOUS DISEASE PROGRESS NOTE    Lazarus Burly Patient Status:  Inpatient    1940 MRN [id number]   [facility] 3NE-A Attending Kim Gentile MD   [facility] Day # 4 PCP Professor Juan Manuel Child 192 ROUTINE     Status: Abnormal    Collection Time: 03/06/20  3:48 AM   Result Value Ref Range    Urine Culture 50,000-99,000 CFU/ML Escherichia coli (A) N/A       Susceptibility    Escherichia coli -  (no method available)     Ampicillin 8 Sensitive      Cef

## 2020-03-10 NOTE — PLAN OF CARE
A/Ox2. Still drowsy but is arousable. Slurred speech is baseline. Pt is on 5L O2 during day as Oxygen needs have increased, BIPAP at night with 1 episode of desaturation into low 80s with increase of O2 to max 15L and resaturation into 90s.  12 L O2 on Bipa SKIN/TISSUE INTEGRITY - ADULT  Goal: Skin integrity remains intact  Description  INTERVENTIONS  - Assess and document risk factors for pressure ulcer development  - Assess and document skin integrity  - Monitor for areas of redness and/or skin breakdown  -

## 2020-03-10 NOTE — PROGRESS NOTES
[address] Patient Status:  Inpatient    1940 MRN [id number]   [facility] 3NE-A Attending Ryder Jones MD   [address] Day # 4 PCP Radhika Gan     SUBJECTIVE: no new events. Currently on room air.   Difficult atraumatic, no cyanosis or edema        Lab Results   Component Value Date    K 3.6 03/10/2020     Lab Results   Component Value Date    PT 14.4 01/04/2012    INR 1.45 (H) 03/09/2020    INR 1.60 (H) 03/08/2020    INR 2.06 (H) 03/07/2020         Recent Labs

## 2020-03-10 NOTE — CM/SW NOTE
Updates sent to [address] [place] [address].  2:06pm  MSW spoke to patient's wife and she states plan is for patient to return to [address] [place] [address] at D/C.

## 2020-03-10 NOTE — PLAN OF CARE
Assumed pt care @ 0730. Drowsy at times oriented to self when alert. Speech difficult to understand. VSS. Denies pain. On room air. NSR on telemetry. NPO. Accu checks q6h. Tube feeds remain on hold.  Speech attempted swallow eval. Pt not following commands

## 2020-03-10 NOTE — PROGRESS NOTES
[facility]  Progress Note     Reyes Moriahla Patient Status:  Inpatient    1940 MRN [id number]   [facility] 3NE-A Attending Juan Valdez MD   Hosp Day # 4 PCP China Mena     Chief Complaint: PNA  S:  No overnight ev TP 6.4 7.5  --   --   --   --   --      Estimated Creatinine Clearance: 70 mL/min (based on SCr of 0.8 mg/dL).   Recent Labs   Lab 03/07/20  0653 03/08/20  0653 03/09/20  0625   PTP 24.5* 19.9* 18.4*   INR 2.06* 1.60* 1.45*     No results for input(s): TR

## 2020-03-11 NOTE — CONSULTS
[facility]  Report of Inpatient Wound Care Consultation     Yen Serrano Patient Status:  Inpatient    1940 MRN [id number]   [facility] 3NE-A [id number] Attending Kwasi Vasquez, DO   Hosp Day # 5 PCP Alton Bowser should be described as unstageable if the base is obscured by eschar or slough. Until enough slough and/or eschar is removed to expose the base of the wound, neither the true depth, nor the stage, can be determined.    Body Part: sacral region     Wound Po being repositioned frequently. Moisture, nutrition and pressure distribution being managed by care team.  Based on this assessment, patient would benefit from the following:     Thank you for this consultation and for allowing me to participate in the care

## 2020-03-11 NOTE — SLP NOTE
SPEECH DAILY NOTE - INPATIENT    ASSESSMENT & PLAN   ASSESSMENT  Patient seen to assess for improvement in swallow function. He is alert with eyes open. Mostly unintelligible speech though a few words here and there are understandable.   He can be resisti in reassessment of swallow function  In Progress     FOLLOW UP  Follow Up Needed: Yes  SLP Follow-up Date: 03/12/20  Number of Visits to Meet Established Goals: 5    Session: 1 of 5    If you have any questions, please contact Luis Eduardo Mckay

## 2020-03-11 NOTE — PROGRESS NOTES
Pulmonary Progress Note        NAME: [address] S: [id number] - MRN: [id number] - Age: 78year old - : 1940        Last 24hrs: Overnight events reviewed, conts to have fluctuating O2 requirements indicative of mucus plugging    OBJECTIVE currently  -BD protocol  2.  PNA  -LLL infiltrate more prominent on most recent CXR, suspect this is related in part to IVF resuscitation since admit and not necessarily worsening/inadequately treated infection  -continue zosyn per ID  -sputum culture with

## 2020-03-11 NOTE — PLAN OF CARE
Assumed care at [address] [place] [address]. Pt is A&O x1, oriented to self. Very difficult to understand speech, slurred, baseline. On RA, . NSR on tele. NS infusing at 75 ml/hr. Strict NPO, pills given through NG tube. Receives IV abx zosyn. Speech to eval tomorrow.  Staff w

## 2020-03-11 NOTE — PROGRESS NOTES
[facility]                INFECTIOUS DISEASE PROGRESS NOTE    Julissa Rachelin Patient Status:  Inpatient    1940 MRN [id number]   [facility] 3NE-A Attending Ifrah Majano MD   [facility] Day # 5 PCP Professor Juan Manuel Child 192 = values in this interval not displayed. No results found for: [facility] Encounter on 03/06/20   1.  URINE CULTURE, ROUTINE     Status: Abnormal    Collection Time: 03/06/20  3:48 AM   Result Value Ref Range    Urine Culture 50,0

## 2020-03-11 NOTE — PROGRESS NOTES
[facility]  Progress Note     Zakiya Lambert Patient Status:  Inpatient    1940 MRN [id number]   [facility] 3NE-A Attending Leslie Burrell MD   Hosp Day # 5 PCP Savana Lala     Chief Complaint: PNA  S:  Reportedly havi ALT 20 21  --   --   --   --   --   --    BILT 0.2 0.3  --   --   --   --   --   --    TP 6.4 7.5  --   --   --   --   --   --     < > = values in this interval not displayed. Estimated Creatinine Clearance: 70 mL/min (based on SCr of 0.8 mg/dL).   R

## 2020-03-11 NOTE — RESPIRATORY THERAPY NOTE
SKIP - Equipment Use Daily Summary:                  . Set Mode:                . Usage in hours:                . 90% Pressure (EPAP) level:                . 90% Insp. Pressure (IPAP): Allison Band AHI:                .  Supplemental Oxygen:    LPM

## 2020-03-12 NOTE — CM/SW NOTE
Care Progression Note:  Active Acute Medical Issue: Nosocomial pneumonia   Other Contributing Medical Factors/Dx. : e coli uti  Length of stay: 6  Avoidable Delays:   Discharge Barriers: continue abxs for uti and pneumonia / ?aspiration--speech eval. For H1

## 2020-03-12 NOTE — RESPIRATORY THERAPY NOTE
SKIP - Equipment Use Daily Summary:                  . Set Mode: BI-FLEX                . Usage in hours: 11:19                . 90% Pressure (EPAP) level: 7                . 90% Insp. Pressure (IPAP): 12                . AHI: 14.4                .  Supplemen

## 2020-03-12 NOTE — PROGRESS NOTES
[facility]                INFECTIOUS DISEASE PROGRESS NOTE    Ushaevra Malloyl Patient Status:  Inpatient    1940 MRN [id number]   [facility] 3NE-A Attending Yazan Rogers MD   [facility] Day # 6 PCP Professor Juan Manuel Padilla [facility] Encounter on 03/06/20   1.  URINE CULTURE, ROUTINE     Status: Abnormal    Collection Time: 03/06/20  3:48 AM   Result Value Ref Range    Urine Culture 50,000-99,000 CFU/ML Escherichia coli (A) N/A       Susceptibility    Esc

## 2020-03-12 NOTE — DIETARY NOTE
[facility]    NUTRITION ASSESSMENT    Pt does not meet malnutrition criteria. NUTRITION DIAGNOSIS/PROBLEM:    Inadequate oral intake related to physiological causes as evidenced by NPO status    NUTRITION INTERVENTION:       1.  Meal and Snacks - as lb)  10/17/11 : 107 kg (236 lb)  05/31/11 : 107.5 kg (237 lb)      NUTRITION:  Diet: NPO  Oral Supplements: na    FOOD/NUTRITION RELATED HISTORY:  Appetite:   Intake: 0%  Intake Meeting Needs: No; Kassiee TF started  Food Allergies: No  Cultural/Ethnic/Rel

## 2020-03-12 NOTE — PROGRESS NOTES
[address] Patient Status:  Inpatient    1940 MRN [id number]   [facility] 3NE-A Attending Sahil Perales, DO   Hosp Day # 6  [address] / Critical Care Progress Note     S: pt back on bipap for 03/09/20  0625 03/12/20  0713   INR 1.60* 1.45* 2.00*         Recent Labs   Lab 03/06/20  0347 03/07/20  0653 03/08/20  0653  03/10/20  0633 03/11/20  0726 03/12/20  0713    142 143  --   --   --   --    K 4.2 3.8 3.4*   < > 3.6 3.6 3.3*    113

## 2020-03-12 NOTE — PROGRESS NOTES
[place] [facility]  Progress Note     Keily Bradford Patient Status:  Inpatient    1940 MRN [id number]   [facility] 3NE-A Attending Herman Mauricio MD   Hosp Day # 6 PCP Mariajose Miller     Chief Complaint: PNA  S:  Patient having --   --   --    ALT 20 21  --   --   --   --   --   --    BILT 0.2 0.3  --   --   --   --   --   --    TP 6.4 7.5  --   --   --   --   --   --     < > = values in this interval not displayed.      Estimated Creatinine Clearance: 70 mL/min (based on SCr of 0

## 2020-03-12 NOTE — PLAN OF CARE
Assumed care at 1. Pt is A&O x2, oriented to self and location. Garbled speech baseline, hx of dementia. On RA, Bipap at night. 02 sats in the 90s.    Nsr on tele, coumadin ordered tonight, per Dr. Sunshine Santos coumadin held, clarified with Dr. Muhammad Sierra

## 2020-03-12 NOTE — SLP NOTE
SPEECH DAILY NOTE - INPATIENT    ASSESSMENT & PLAN   ASSESSMENT  Patient seen at bedside for ongoing diagnostic intervention for PO intake readiness. Spouse present. Pt with stable O2 sats and RR while on 6L O2 via NC.   Pt verbally responsive with appropri Visits to Meet Established Goals: 5    Session: 2    If you have any questions, please contact Rony Chahal 87 CCC-SLP/L, pager [phone]  Speech-LanguagePathologist

## 2020-03-13 NOTE — PLAN OF CARE
I called spouse, no answer left call back #. Son Catheryn Ganser then called RN asking for me. I called him, no answer also. Will try to touch base with them and review events of this admission.  ER June records indicate more comfort care approach, however has be

## 2020-03-13 NOTE — CONSULTS
ABDIAS Tomas 77  [id number]  Hospital Day #7  Date of Consult:   3/13/2020        Reason for Consultation:      Consult requested by Leslee Lockhart  for evaluation of palliative care needs, Jacob Reese Information:  Zoroastrian Affiliation: leida    Functional Status: He has not walked for the past 3 years while living at [facility]. He had dinner with his family every night while at [facility].   Historically he recurrent hospitalizations after falls and w 03/13/2020    HCT 36.6 (L) 03/13/2020    .0 03/13/2020       Coags:  Lab Results   Component Value Date    PT 14.4 01/04/2012    INR 2.00 (H) 03/12/2020    PTT 38.2 (H) 03/06/2020     Chemistry:  Lab Results   Component Value Date    CREATSERUM 0.72 Occasional  Assist Normal or   Reduced Full or confused   50 Mainly sit/lie Extensive Disease  Can’t do any work   Partial Assist Normal or Reduced Full or confused   40 Mainly in bed Extensive Disease  Can’t do any work Mainly Assist Normal or Reduced Ful emotional then his wife Ariana Steele. Ariana Steele and Williams Newton recalled the series of events since last Thursday. They understand he has PNA, influenza and UTI. Nany Iglesias stated it was difficulty to watch his swallow evaluation as he was not able to swallow.  She shared he not sustain him long term, he could have medication/ Morphine to make the work of breathing easier and the tube could come out of his nose.  Comfort care would focus on symptom relief, not treating his symptoms and hospice would assist.. Thelma Shay understands appropriate for on going medical management and treatment for new potential symptoms or complications with avoidance of aggressive resuscitation ( DNR/DNI is appropriate)     Comfort care is appropriate when further readmissions, aggressive treatment and e [phone]  3/13/2020, 3:52 PM

## 2020-03-13 NOTE — PROGRESS NOTES
Pulmonary Progress Note        NAME: [address] S: 1206/4544-O - MRN: [id number] - Age: 78year old - : 1940        Last 24hrs: No events overnight, back on 6L this AM w/ sats in the high 90s    OBJECTIVE:   20  0043 20  6176 protocol  2.  PNA  -LLL infiltrate more prominent on most recent CXR, suspect this is related in part to IVF resuscitation since admit and not necessarily worsening/inadequately treated infection  -continue zosyn per ID  -sputum culture with normal respirat

## 2020-03-13 NOTE — DIETARY NOTE
Clinical Nutrition    Vital 1.2 started @ 20ml/hr last night. No reported residuals. No reported nausea/vomiting. Labs reviewed - concern for refeeding due to drop in K+ & Mg. Phos also noted to be low.  Continue Vital 1.2 @ 20ml/hr & recommend repleting el

## 2020-03-13 NOTE — PROGRESS NOTES
[facility]                INFECTIOUS DISEASE PROGRESS NOTE    Vero Felton Patient Status:  Inpatient    1940 MRN [id number]   [facility] 3NE-A Attending Bruna Snowden MD   [facility] Day # 7 PCP Professor Juan Manuel Padilla Gentamicin <=1 Sensitive      Meropenem <=0.25 Sensitive      Levofloxacin >=8 Resistant      Nitrofurantoin <=16 Sensitive      Piperacillin + Tazobactam <=4 Sensitive      Trimethoprim/Sulfa <=20 Sensitive    2.  BLOOD CULTURE     Status: None    Collecti

## 2020-03-13 NOTE — SLP NOTE
Attempted to see Pt for ongoing swallowing assessment at bedside; Pt sleeping and arousable to verbal stimuli however unable to maintain sustained alertness for PO intake. Spouse not present. SLP to con't to follow.   Anticipate as Pt sustained alertness im

## 2020-03-13 NOTE — PROGRESS NOTES
[place] [facility]  Progress Note     Sukhwinder Weldon Patient Status:  Inpatient    1940 MRN [id number]   [facility] 3NE-A Attending Mac Trevino MD   Hosp Day # 7 PCP Claribel Eldridge     Chief Complaint: PNA  S:  Tube feeds star input(s): TROP, CK in the last 168 hours. Imaging: Imaging data reviewed in Epic.   Medications:   • potassium chloride 40mEq IVPB (peripheral line)  40 mEq Intravenous Once    Followed by   • potassium chloride  20 mEq Intravenous Once   • guaiFENesin

## 2020-03-13 NOTE — PLAN OF CARE
Patient alert to baseline. IV fluids running per orders, IV ABX, IV potassium per orders. Patient repositioned for comfort. No signs of pain/discomfort noted. Feeding via NG tube, residual 10cc-dietary notified. Flush 50ml every 6 hours.  Pump programme tolerated, if ordered  - Obtain nutritional consult as needed  - Evaluate fluid balance  Outcome: Progressing

## 2020-03-13 NOTE — PLAN OF CARE
A&Ox2, garbled speech. Bipap worn while sleeping. Deep suction PRN. NSR on tele. VSS. Appears to be resting comfortably in bed. IV zosyn. Tube feeding running at 20mL/hr. Will not advance tonight per dietary. PIV- IVF. Multiple loose BM.  Droplet isolation Minimize aspiration risk  Description  -NPO  -Frequent, gentle oral care/hygiene with suction, as tolerated   Outcome: Progressing     Problem: GASTROINTESTINAL - ADULT  Goal: Minimal or absence of nausea and vomiting  Description  INTERVENTIONS:  - Loreta Key

## 2020-03-14 NOTE — PROGRESS NOTES
[place] [facility]  Progress Note     Alicia Whitaker Patient Status:  Inpatient    1940 MRN [id number]   [facility] 3NE-A Attending Kobe Davis MD   Hosp Day # 8 PCP Negro List     Chief Complaint: PNA  S:  Is having loose Epic.  Medications:   • potassium chloride 40mEq IVPB (peripheral line)  40 mEq Intravenous Once   • sodium chloride  4 mL Nebulization TID   • guaiFENesin  400 mg Oral Q6H   • Warfarin Sodium  2.5 mg Oral Nightly   • ipratropium-albuterol  3 mL Nebulizati

## 2020-03-14 NOTE — PROGRESS NOTES
[facility]                INFECTIOUS DISEASE PROGRESS NOTE    Elias Milian Patient Status:  Inpatient    1940 MRN [id number]   [facility] 3NE-A Attending Marty Lemus MD   [facility] Day # 8 PCP Professor Juan Manuel Child 192 Sensitive      Levofloxacin >=8 Resistant      Nitrofurantoin <=16 Sensitive      Piperacillin + Tazobactam <=4 Sensitive      Trimethoprim/Sulfa <=20 Sensitive    2.  BLOOD CULTURE     Status: None    Collection Time: 03/06/20  3:48 AM   Result Value Ref R

## 2020-03-14 NOTE — PROGRESS NOTES
Pulmonary Progress Note     Assessment / Plan:  1. Acute hypoxemic respiratory failure: 2/2 influenza with superimposed bacterial PNA, mucus plugging likely also playing a role. Resolved  -on RA  -cont pulmonary toilet with metanebs   -BD protocol  2.  PNA

## 2020-03-14 NOTE — PLAN OF CARE
Assumed care of pt at [address]. Pt drowsy. Unable to assess level of orientation. He is only able to tell me his name. Incomprehensible speech. Bipap at night. VSS. 2 loose BM. Appears to be resting comfortably in bed. Tube feedings continued at 20mL/hr.  Resid needed  Outcome: Progressing     Problem: Impaired Swallowing  Goal: Minimize aspiration risk  Description  -NPO  -Frequent, gentle oral care/hygiene with suction, as tolerated   Outcome: Progressing     Problem: GASTROINTESTINAL - ADULT  Goal: Minimal or

## 2020-03-14 NOTE — SLP NOTE
SPEECH DAILY NOTE - INPATIENT    ASSESSMENT & PLAN   ASSESSMENT  Patient was seen for clinical swallow re-evaluation this morning. RN approved this visit.  Hospital course complicated by findings of influenza H1 , superimposed bacterial PNA, left lower lobe ice chips with cues to close mouth and manipulate ice chip; no  anterior leakage; increased A-P time on all trials; reduction in bolus formation with evidence min-mild of oral residuals scattered throughout oral cavity; cannot r/o reduced bolus control.

## 2020-03-14 NOTE — RESPIRATORY THERAPY NOTE
SKIP Equipment Usage Summary :            Set Mode :BIPAP W FLEX          Usage in Hours:10;41          90% Pressure (EPAP) : 7           90% Insp Pressure (IPAP);12          AHI : 10.3          Supplemental Oxygen LPM : 6          SPONT IPAP PRESSURE setti

## 2020-03-14 NOTE — PLAN OF CARE
Patient alert to baseline. Con't to reposition. IV fluids/ABX/Potassium running per orders. DW/ Dr. Marylee Parry rectal tube d/t LBMs,no rectal tube at this point. Hospitalist notified in change in wound/sacrum. Texan cath applied.   Dietary ordered same feed Evaluate fluid balance  Outcome: Progressing

## 2020-03-15 NOTE — SLP NOTE
Attempted to re-evaluate swallow skills today, however patient unavailable and now receiving RT treatment. Will continue POC tomorrow.

## 2020-03-15 NOTE — PROGRESS NOTES
[place] [facility]  Progress Note     Marcell Moreno Patient Status:  Inpatient    1940 MRN [id number]   [facility] 3NE-A Attending Judi Pearson MD   Hosp Day # 9 PCP Garcia Hammer     Chief Complaint: PNA  S: a little more al • sodium chloride  4 mL Nebulization TID   • guaiFENesin  400 mg Oral Q6H   • Warfarin Sodium  2.5 mg Oral Nightly   • ipratropium-albuterol  3 mL Nebulization QID   • piperacillin-tazobactam  4.5 g Intravenous Q8H   • Donepezil HCl  10 mg Oral Nightly

## 2020-03-15 NOTE — PROGRESS NOTES
A/O x 1  Droplet precautions for flu maintained  Accucheck Q 6  Feeding at 20ml hr, check residuals Q 6. Tolerating at this time, residual was 90 at 2200 and 10ml at 0500.   [facility]  Diarrhea 2 x overnight  Bottom excoriated- Mepilex  Famil

## 2020-03-15 NOTE — PLAN OF CARE
Patient alert X1 but more responsive today than previous days. No c/o pain when asked, no signs of pain/discomfort. Patient repositioned. IV fluids/ABX/potassium running per orders. Plan of care/updates discusses w/son Ryan over phone.  Wife was with [place]

## 2020-03-16 PROBLEM — J18.9 PNEUMONIA: Status: ACTIVE | Noted: 2020-01-01

## 2020-03-16 NOTE — RESPIRATORY THERAPY NOTE
SPONT   IPAP PRESSURE setting : 12                                EPAP PRESSURE ) setting : 7   Pt on BiPAP per Dr. Chai Butler. Will monitor pt.

## 2020-03-16 NOTE — RESPIRATORY THERAPY NOTE
SKIP Equipment Usage Summary :            Set Mode :BIPAP W FLEX          Usage in Hours:10;22          90% Pressure (EPAP) : 7           90% Insp Pressure (IPAP);12          AHI : 23          Supplemental Oxygen LPM :          BIpap ( INSP PRESSURE ) setti

## 2020-03-16 NOTE — PROGRESS NOTES
[facility]  Follow Up     Saeed Whitney  [id number]  Hospital Day #10  Date of Consult: 03/13/20  Patient seen at: [facility]     Subjective:      Patient was seen and examined without family at the bedside.    Way 03/13/2020     03/13/2020    K 3.3 (L) 03/16/2020     03/13/2020    CO2 24.0 03/13/2020     (H) 03/13/2020    CA 7.5 (L) 03/13/2020    ALB 3.0 (L) 03/06/2020    ALKPHO 72 03/06/2020    BILT 0.3 03/06/2020    TP 7.5 03/06/2020    AST 17 0 do any work Max Assist  Total Care Minimal Drowsy/confused   10 Bedbound/coma Extensive Disease  Can’t do any work  coma  Max Assist  Total Care [address] or coma   0 Death     Palliative Care Assessment     Goals of Care:  Rudy Escobar his wife was pres deferred to [address] - wife Megha Islas  - Psychosocial: Emotional support provided to the family.      - Disposition: plan to transition to comfort care with support of Hospice    - Discussed today’s visit with Jesus Manuel Enriquze Ma

## 2020-03-16 NOTE — PLAN OF CARE
Patient is A&Ox1  Denies any pain or discomfort  No n/v/d  Afebrile, VSS  TF at 20ml/hr, residual checks q6h: 85ml at 2000  NSR on tele; on room air  L  NGT noted  Sensicare applied to sacral-perineal region; scrotum swollen and red  On clinitron bed  Accu nausea and vomiting  Description  INTERVENTIONS:  - Maintain adequate hydration with IV or PO as ordered and tolerated  - Nasogastric tube to low intermittent suction as ordered  - Evaluate effectiveness of ordered antiemetic medications  - Provide nonphar

## 2020-03-16 NOTE — DISCHARGE SUMMARY
[facility] HOSPITALIST  DISCHARGE SUMMARY     Joel Melo Patient Status:  Inpatient    1940 MRN [id number]   [facility] 3NE-A Attending John Hennessy, DO   Hosp Day # 10 PCP Delma Angelo     Date of Admission: 3/6/2020  Date of [place] care was consulted. We reviewed GOC and discussed option of hospice. Spouse and 2 children present today have decided on inpatient hospice.     Consultants: pulmonology, ID, wound care, palliative care      ELMIRA Ren    Time spent:  > 30 minute

## 2020-03-16 NOTE — HOSPICE RN NOTE
Hospice SW and RN met with wife Isabel and son Ghulam Allison and his wife Jess Ryan to review hospice philosophy and benefit. Levels of hospice and GOC reviewed. Questions answered. Consents signed for hospice.   Pt evaluated and found to have RR36 with BiPap in place w

## 2020-03-16 NOTE — CM/SW NOTE
Received referral , placed ECIN. Called [place], [place] Will call wife to set time to meet. .     Meeting at 2 pm in front lobby

## 2020-03-16 NOTE — PLAN OF CARE
Reviewed w/ palliative apn. She just spoke with spouse who is ready for Hospice consult.   Updated MD.    Chuy KU  12:41 PM

## 2020-03-16 NOTE — HOSPICE RN NOTE
Spoke with wife Мария Mckinney about VS once per shift being usual care for hospice and she is ok with turning off the monitor in the room. RN Tawanna informed.

## 2020-03-16 NOTE — PROGRESS NOTES
03/16/20 1722   Clinical Encounter Type   Visited With Patient and family together   Routine Visit Follow-up   Continue Visiting No  ( remains available at pager [phone])   Patient's Supportive Strategies/Resources family requesting spiritual care

## 2020-03-16 NOTE — PROGRESS NOTES
Pulmonary Progress Note        NAME: [address] [place] [address] Rd S: 5820/3058-T - MRN: [id number] - Age: 78year old - : 1940        Last 24hrs: No events overnight, less alert today    OBJECTIVE:   20  0724 20  0900 20  1206 20  1 use during the day due to AMS  5. Influenza  -completed peramivir x 2d  6.  Dispo:  -will follow   -DNR, family to meet with hospice  Sarahy Islas [facility] Pulmonary and Critical Care

## 2020-03-16 NOTE — PROGRESS NOTES
[place] [facility]  Progress Note     Vero Felton Patient Status:  Inpatient    1940 MRN [id number]   [facility] 3NE-A Attending Bruna Snowden MD   Hosp Day # 10 PCP Anh Oscar     Chief Complaint: PNA  S: wants NG out, d reviewed in Epic.   Medications:   • potassium chloride 40mEq IVPB (peripheral line)  40 mEq Intravenous Once   • sodium chloride  4 mL Nebulization TID   • guaiFENesin  400 mg Oral Q6H   • Warfarin Sodium  2.5 mg Oral Nightly   • ipratropium-albuterol  3 m

## 2020-03-16 NOTE — PLAN OF CARE
Pt transitioned to inpatient hospice this afternoon.    Breathing labored, BiPAP removed, 2L nc for comfort   Robinul PRN given for secretions   Ativan PRN given for agitation   Morphine drip running at 1mg/hr titrate as needed to keep respirations under 15 Progressing     Problem: DECISION MAKING  Goal: Pt/Family able to effectively weigh alternatives and participate in decision making related to treatment and care  Description  INTERVENTIONS:  - Determine when there are differences between patient's view, f Pt feels balance and connection with higher power that empowers the self during times of loss, guilt and fear  Description  INTERVENTIONS:  - Create safety for patient through empathic presence and non-judgmental listening  - Encourage patient to explore h

## 2020-03-17 NOTE — PLAN OF CARE
Time of death 36. Charge RN, Maribell Melara, confirmed time of death. Hospitalist, Dr. Teri Richard, notified. Wife at bedside, notified other family. Bereavement tray ordered.    Spiritual care, Public Safety, Medical Records, Nursing Supervisor, [facility],

## 2020-03-17 NOTE — SPIRITUAL CARE NOTE
Supportive visit to pt. No family present, CHP made supportive call to spouse Claire Duckworth provided support via reflective listening and validation. Pt appeared uncomfortable, non-verbal. Report made to [facility] RN.

## 2020-03-17 NOTE — PROGRESS NOTES
03/17/20 1406   Clinical Encounter Type   Visited With Family   Patient's Supportive Strategies/Resources Nurse at [facility]. [facility] informed that patient passed. Patient Spiritual Encounters   Feelings of Loneliness/Hopelessness Accepting; Sadness  (Appropri

## 2020-03-17 NOTE — HOSPICE RN NOTE
CONNER fernando2. No family present. Pt. w RR 20, significant accessory muscle use observed, no s/s pain, congestion reduced from evaluation yesterday. MS drip is at 3mg/h.   Rounded with MARICRUZ Nicholas and [address] [place] [address] who observes same and is in agreement with POC to

## 2020-03-17 NOTE — DISCHARGE SUMMARY
[facility] [place] HOSPITALIST  DISCHARGE SUMMARY     Tarah Ho Patient Status:  Inpatient    1940 MRN [id number]   [facility] 3NE-A Attending Izabela Estrada, DO   Hosp Day # 1 PCP Naresh Harper     Date of Admission: 3/16/2020  Date of Bruno Traore

## 2020-03-17 NOTE — H&P
[place] [facility]  History and Physical     Jackie Jeffery Patient Status:  Inpatient    1940 MRN [id number]   [facility] 3NE-A Attending Lo Todd, DO   Hosp Day # 1 PCP Melissa Vargas     Chief Complaint: SOB    History of bilaterally mildly labored NIPPV  Cardiovascular: S1, S2. RRR 90s  Abdomen: Soft, nontender, nondistended. Positive bowel sounds. Musculoskeletal: very deconditioned  Extremities: No edema.       Diagnostic Data:      Labs:  Recent Labs   Lab 03/12/20  07 breathing pattern  Heart: RRR, no murmurs  Abdomen: Soft, NTND  Extremities: No Edema    Assessment and Plan:    As above  He has been transitioned to inpatient hospice, will focus treatment efforts on comfort          Nani Guillory, 03/17/20, 10:59 AM

## 2020-03-17 NOTE — HOSPICE RN NOTE
Visit to family at bedside following pt having passed. Support provided. Reminded of bereavement services available through [facility].

## 2020-03-17 NOTE — PROGRESS NOTES
[place] [facility]  Progress Note     Joel Melo Patient Status:  Inpatient    1940 MRN [id number]   [facility] 3NE-A Attending Yazan Rogers MD   Hosp Day # 1 PCP Delma Angelo     Chief Complaint: SOB    S: with increase acidosis, resolved  5. Old CVA  6. Advanced Dementia  7. Deconditioning   8. dysphagia  9. SKIP  10. Hypertension  11. Hyperlipidemia    Comfort care order set.   Pt resp more labored today, increase morphine drip and give ativan now as d/w hospice liaison a

## 2020-03-17 NOTE — PLAN OF CARE
Assumed pt care at 0730. [address], [address] orientation. 2L NC O2 for comfort. No tele, . Droplet iso d/c'ed per [facility] policy. NPO. Incontinent, briefed. Mepilex on sacrum. 0.9NS infusing @ 10 mL/hr in L forearm PIV.  Morphine continuous gtt, titrated provider's view of condition  - Facilitate patient and family articulation of goals for care  - Help patient and family identify pros/cons of alternative solutions  - Provide information as requested by patient/family  - Respect patient/family right to rec and/or spiritual images and practices  - Encourage use of breath work, imagery, meditation, relaxation, reiki to ease distress and provide healing  - Encourage use of cultural and spiritual celebrations and rituals  - Facilitate discussion that helps patie

## 2025-01-19 NOTE — PLAN OF CARE
Assumed care @ 0700. Pt a/o x2, (+) Garbled and incomprehensive speech @ baseline. VSS. Tele SR. Pt received on BiPAP on, changed to [facility], O2 sat 96-97%. No acute respiratory distress noted. Denies any pain.   Pt maintained bedrest.   Speech thera 3L oxygen via nasal cannula, cancer precautions/fall precautions/oxygen therapy device and L/min